# Patient Record
Sex: MALE | Race: WHITE | NOT HISPANIC OR LATINO | Employment: OTHER | URBAN - NONMETROPOLITAN AREA
[De-identification: names, ages, dates, MRNs, and addresses within clinical notes are randomized per-mention and may not be internally consistent; named-entity substitution may affect disease eponyms.]

---

## 2017-01-26 ENCOUNTER — OFFICE VISIT (OUTPATIENT)
Dept: CARDIOLOGY | Facility: CLINIC | Age: 82
End: 2017-01-26

## 2017-01-26 VITALS
HEART RATE: 61 BPM | OXYGEN SATURATION: 98 % | BODY MASS INDEX: 27.17 KG/M2 | WEIGHT: 205 LBS | HEIGHT: 73 IN | SYSTOLIC BLOOD PRESSURE: 96 MMHG | DIASTOLIC BLOOD PRESSURE: 60 MMHG

## 2017-01-26 DIAGNOSIS — I25.10 CAD IN NATIVE ARTERY: Primary | ICD-10-CM

## 2017-01-26 DIAGNOSIS — I10 ESSENTIAL HYPERTENSION: ICD-10-CM

## 2017-01-26 DIAGNOSIS — E78.2 MIXED HYPERLIPIDEMIA: ICD-10-CM

## 2017-01-26 PROBLEM — R94.39 ABNORMAL NUCLEAR STRESS TEST: Status: ACTIVE | Noted: 2017-01-26

## 2017-01-26 PROCEDURE — 99214 OFFICE O/P EST MOD 30 MIN: CPT | Performed by: INTERNAL MEDICINE

## 2017-01-26 PROCEDURE — 93000 ELECTROCARDIOGRAM COMPLETE: CPT | Performed by: INTERNAL MEDICINE

## 2017-01-26 RX ORDER — ATORVASTATIN CALCIUM 20 MG/1
TABLET, FILM COATED ORAL EVERY OTHER DAY
COMMUNITY
Start: 2017-01-23

## 2017-01-26 RX ORDER — PANTOPRAZOLE SODIUM 40 MG/1
TABLET, DELAYED RELEASE ORAL
COMMUNITY
Start: 2017-01-22

## 2017-01-26 RX ORDER — CLOPIDOGREL BISULFATE 75 MG/1
TABLET ORAL
COMMUNITY
Start: 2017-01-22

## 2017-01-26 RX ORDER — TAMSULOSIN HYDROCHLORIDE 0.4 MG/1
CAPSULE ORAL
COMMUNITY
Start: 2017-01-11 | End: 2017-08-24 | Stop reason: SDUPTHER

## 2017-01-26 RX ORDER — SUCRALFATE 1 G/1
TABLET ORAL
COMMUNITY
Start: 2017-01-22

## 2017-01-26 RX ORDER — LOSARTAN POTASSIUM AND HYDROCHLOROTHIAZIDE 12.5; 5 MG/1; MG/1
TABLET ORAL
COMMUNITY
Start: 2016-11-17

## 2017-01-26 RX ORDER — FINASTERIDE 5 MG/1
TABLET, FILM COATED ORAL
COMMUNITY
Start: 2017-01-22 | End: 2017-08-24 | Stop reason: SDUPTHER

## 2017-01-26 NOTE — LETTER
January 26, 2017     Viktor Frederick MD  [address] [place] [address] [address]  [place] [address]    Patient: Pat Hogue   YOB: 1935   Date of Visit: 1/26/2017       Dear Dr. Otoniel MD:    Thank you for referring Pat Hogue to me for evaluation. Below are the relevant portions of my assessment and plan of care.    If you have questions, please do not hesitate to call me. I look forward to following Pat along with you.         Sincerely,        Ozzie Bearden MD        CC: No Recipients  Ozzie Bearden MD  1/26/2017  9:11 AM  Signed  Pat Hogue  [id number]  1935  81 y.o.  male    Referring Provider: Viktor Frederick MD    Reason for Follow-up Visit: CAD    Overall doing well  Denies any chest pain  No excessive shortness of breath  Compliant with medications    History of present illness:  Pat Hogue is a 81 y.o. yo male with history of CAD who presents today for   Chief Complaint   Patient presents with   • Coronary Artery Disease     6 mo    .    History  Past Medical History   Diagnosis Date   • Abnormal nuclear stress test    • Benign essential hypertension    • CAD in native artery    • Chest pain    • Chronic airway obstruction    • GI bleed    • Hematuria    • History of coronary artery bypass graft    • Hyperlipemia, mixed    • Hypertension    • Stented coronary artery    • Venous thrombosis    ,   Past Surgical History   Procedure Laterality Date   • Cardiac catheterization Left 2006     : small vd, med mgmt;;2/2011 med mgm   • Gastrectomy partial / total     • Coronary artery bypass graft  03/07/2006      LIMA to LAD, SVG to D1, SVG to OM2, SVG to PDA   • Cholecystectomy     • Coronary stent placement       x 1 - 4/27/14   ,   Family History   Problem Relation Age of Onset   • Heart attack Father    • No Known Problems Mother    ,   Social History   Substance Use Topics   • Smoking status: Former Smoker     Quit date: 1985   • Smokeless tobacco: None   • Alcohol use No   ,      "    Medications  Current Outpatient Prescriptions   Medication Sig Dispense Refill   • amLODIPine (NORVASC) 2.5 MG tablet Take 5 mg by mouth Daily.     • aspirin 81 MG tablet Take 81 mg by mouth Daily.     • atorvastatin (LIPITOR) 20 MG tablet      • clopidogrel (PLAVIX) 75 MG tablet      • finasteride (PROSCAR) 5 MG tablet      • losartan-hydrochlorothiazide (HYZAAR) 50-12.5 MG per tablet      • Multiple Vitamins-Minerals (MULTIVITAMIN ADULT PO) Take  by mouth.     • pantoprazole (PROTONIX) 40 MG EC tablet      • ranolazine (RANEXA) 1000 MG 12 hr tablet Take 1,000 mg by mouth 2 (Two) Times a Day.     • sucralfate (CARAFATE) 1 G tablet      • tamsulosin (FLOMAX) 0.4 MG capsule 24 hr capsule        No current facility-administered medications for this visit.        Allergies:  Morphine and related    Review of Systems  Review of Systems   Constitution: Negative.   HENT: Negative.    Eyes: Negative.    Cardiovascular: Negative for chest pain, claudication, cyanosis, dyspnea on exertion, irregular heartbeat, leg swelling, near-syncope, orthopnea, palpitations, paroxysmal nocturnal dyspnea and syncope.   Respiratory: Negative.    Endocrine: Negative.    Hematologic/Lymphatic: Negative.    Skin: Negative.    Gastrointestinal: Negative for anorexia.   Genitourinary: Negative.    Neurological: Negative.    Psychiatric/Behavioral: Negative.        Objective     Physical Exam:  Visit Vitals   • BP 96/60   • Pulse 61   • Ht 73\" (185.4 cm)   • Wt 205 lb (93 kg)   • SpO2 98%   • BMI 27.05 kg/m2     Physical Exam   Constitutional: He appears well-developed.   HENT:   Head: Normocephalic.   Neck: Normal carotid pulses and no JVD present. No tracheal tenderness present. Carotid bruit is not present. No tracheal deviation and no edema present.   Cardiovascular: Regular rhythm, normal heart sounds and normal pulses.    Pulmonary/Chest: Effort normal. No stridor.   Abdominal: Soft.   Neurological: He is alert. He has normal strength. " No cranial nerve deficit or sensory deficit.   Skin: Skin is warm.   Psychiatric: He has a normal mood and affect. His speech is normal and behavior is normal.       Results Review:       ECG 12 Lead  Date/Time: 1/26/2017 9:08 AM  Performed by: ALESSIA ALARCON  Authorized by: ALESSIA ALARCON   Comparison: compared with previous ECG from 6/16/2016  Similar to previous ECG  Rhythm: sinus bradycardia  Rate: bradycardic  QRS axis: normal  Clinical impression: normal ECG            Assessment/Plan   Patient Active Problem List   Diagnosis   • CAD in native artery   • Essential hypertension   • Mixed hyperlipidemia   • Abnormal nuclear stress test MODERATE LATERAL ISCHEMIA        No palpitations. No significant pedal edema. Compliant with medications and diet. Latest labs and medications reviewed.    Plan:    Close follow up with you as scheduled.  Intensive factor modifications.  See order list.    Counseled regarding disease appropriate diet, fluid, caffeine, stimulants and sodium intake as well as importance of compliance to diet, exercise and regular follow up.  Avoid NSAIDS  He does not want any invasive testing    Return in about 6 months (around 7/26/2017).

## 2017-01-26 NOTE — PROGRESS NOTES
Pat Hogue  [id number]  1935  81 y.o.  male    Referring Provider: Viktor Frederick MD    Reason for Follow-up Visit: CAD    Overall doing well  Denies any chest pain  No excessive shortness of breath  Compliant with medications    History of present illness:  Pat Hogue is a 81 y.o. yo male with history of CAD who presents today for   Chief Complaint   Patient presents with   • Coronary Artery Disease     6 mo    .    History  Past Medical History   Diagnosis Date   • Abnormal nuclear stress test    • Benign essential hypertension    • CAD in native artery    • Chest pain    • Chronic airway obstruction    • GI bleed    • Hematuria    • History of coronary artery bypass graft    • Hyperlipemia, mixed    • Hypertension    • Stented coronary artery    • Venous thrombosis    ,   Past Surgical History   Procedure Laterality Date   • Cardiac catheterization Left 2006     : small vd, med mgmt;;2/2011 med mgm   • Gastrectomy partial / total     • Coronary artery bypass graft  03/07/2006      LIMA to LAD, SVG to D1, SVG to OM2, SVG to PDA   • Cholecystectomy     • Coronary stent placement       x 1 - 4/27/14   ,   Family History   Problem Relation Age of Onset   • Heart attack Father    • No Known Problems Mother    ,   Social History   Substance Use Topics   • Smoking status: Former Smoker     Quit date: 1985   • Smokeless tobacco: None   • Alcohol use No   ,     Medications  Current Outpatient Prescriptions   Medication Sig Dispense Refill   • amLODIPine (NORVASC) 2.5 MG tablet Take 5 mg by mouth Daily.     • aspirin 81 MG tablet Take 81 mg by mouth Daily.     • atorvastatin (LIPITOR) 20 MG tablet      • clopidogrel (PLAVIX) 75 MG tablet      • finasteride (PROSCAR) 5 MG tablet      • losartan-hydrochlorothiazide (HYZAAR) 50-12.5 MG per tablet      • Multiple Vitamins-Minerals (MULTIVITAMIN ADULT PO) Take  by mouth.     • pantoprazole (PROTONIX) 40 MG EC tablet      • ranolazine (RANEXA) 1000 MG 12 hr tablet  "Take 1,000 mg by mouth 2 (Two) Times a Day.     • sucralfate (CARAFATE) 1 G tablet      • tamsulosin (FLOMAX) 0.4 MG capsule 24 hr capsule        No current facility-administered medications for this visit.        Allergies:  Morphine and related    Review of Systems  Review of Systems   Constitution: Negative.   HENT: Negative.    Eyes: Negative.    Cardiovascular: Negative for chest pain, claudication, cyanosis, dyspnea on exertion, irregular heartbeat, leg swelling, near-syncope, orthopnea, palpitations, paroxysmal nocturnal dyspnea and syncope.   Respiratory: Negative.    Endocrine: Negative.    Hematologic/Lymphatic: Negative.    Skin: Negative.    Gastrointestinal: Negative for anorexia.   Genitourinary: Negative.    Neurological: Negative.    Psychiatric/Behavioral: Negative.        Objective     Physical Exam:  Visit Vitals   • BP 96/60   • Pulse 61   • Ht 73\" (185.4 cm)   • Wt 205 lb (93 kg)   • SpO2 98%   • BMI 27.05 kg/m2     Physical Exam   Constitutional: He appears well-developed.   HENT:   Head: Normocephalic.   Neck: Normal carotid pulses and no JVD present. No tracheal tenderness present. Carotid bruit is not present. No tracheal deviation and no edema present.   Cardiovascular: Regular rhythm, normal heart sounds and normal pulses.    Pulmonary/Chest: Effort normal. No stridor.   Abdominal: Soft.   Neurological: He is alert. He has normal strength. No cranial nerve deficit or sensory deficit.   Skin: Skin is warm.   Psychiatric: He has a normal mood and affect. His speech is normal and behavior is normal.       Results Review:       ECG 12 Lead  Date/Time: 1/26/2017 9:08 AM  Performed by: ALESSIA ALARCON  Authorized by: ALESSIA ALARCON   Comparison: compared with previous ECG from 6/16/2016  Similar to previous ECG  Rhythm: sinus bradycardia  Rate: bradycardic  QRS axis: normal  Clinical impression: normal ECG            Assessment/Plan   Patient Active Problem List   Diagnosis   • CAD in native artery "   • Essential hypertension   • Mixed hyperlipidemia   • Abnormal nuclear stress test MODERATE LATERAL ISCHEMIA        No palpitations. No significant pedal edema. Compliant with medications and diet. Latest labs and medications reviewed.    Plan:    Close follow up with you as scheduled.  Intensive factor modifications.  See order list.    Counseled regarding disease appropriate diet, fluid, caffeine, stimulants and sodium intake as well as importance of compliance to diet, exercise and regular follow up.  Avoid NSAIDS  He does not want any invasive testing    Return in about 6 months (around 7/26/2017).

## 2017-01-26 NOTE — MR AVS SNAPSHOT
Pat Hogue   1/26/2017 8:15 AM   Office Visit    Dept Phone:  [phone]   Encounter #:  [id number]    Provider:  Ozzie Bearden MD   Department:  [facility]                Your Full Care Plan              Your Updated Medication List          This list is accurate as of: 1/26/17  9:16 AM.  Always use your most recent med list.                amLODIPine 2.5 MG tablet   Commonly known as:  NORVASC       aspirin 81 MG tablet       atorvastatin 20 MG tablet   Commonly known as:  LIPITOR       clopidogrel 75 MG tablet   Commonly known as:  PLAVIX       finasteride 5 MG tablet   Commonly known as:  PROSCAR       losartan-hydrochlorothiazide 50-12.5 MG per tablet   Commonly known as:  HYZAAR       MULTIVITAMIN ADULT PO       pantoprazole 40 MG EC tablet   Commonly known as:  PROTONIX       RANEXA 1000 MG 12 hr tablet   Generic drug:  ranolazine       sucralfate 1 G tablet   Commonly known as:  CARAFATE       tamsulosin 0.4 MG capsule 24 hr capsule   Commonly known as:  FLOMAX               We Performed the Following     ECG 12 Lead       You Were Diagnosed With        Codes Comments    CAD in native artery    -  Primary ICD-10-CM: I25.10  ICD-9-CM: 414.01     Essential hypertension     ICD-10-CM: I10  ICD-9-CM: 401.9     Mixed hyperlipidemia     ICD-10-CM: E78.2  ICD-9-CM: 272.2       Instructions     None    Patient Instructions History      Upcoming Appointments     Visit Type Date Time Department    FOLLOW UP 1/26/2017  8:15 AM [facility]    NEW PATIENT 2/16/2017 10:00 AM [facility]    FOLLOW UP 7/27/2017  8:00 AM [facility]      MyChart Signup     [facility] [employer] allows you to send messages to your doctor, view your test results, renew your prescriptions, schedule appointments, and more. To sign up, go to [employer] and click on the Sign Up Now link in the New User? box. Enter your [employer] Activation Code exactly as  "it appears below along with the last four digits of your Social Security Number and your Date of Birth () to complete the sign-up process. If you do not sign up before the expiration date, you must request a new code.    [employer] Activation Code: [email or web address]  Expires: 2017  9:14 AM    If you have questions, you can email [email or web address] or call [phone] to talk to our [employer] staff. Remember, [employer] is NOT to be used for urgent needs. For medical emergencies, dial 911.               Other Info from Your Visit           Your Appointments     2017 10:00 AM CST   New Patient with Loi Pace MD   [facility] UROLOGY (--)    [address]  [place] [address] [phone]           Bring all previous medical records and films, along with current medications and insurance information.            2017  8:00 AM CDT   Follow Up with Ozzie Bearden MD   [facility] (--)    [address]  [place] [address] [phone]           Arrive 15 minutes prior to appointment.              Allergies     Morphine And Related        Reason for Visit     Coronary Artery Disease 6 mo       Vital Signs     Blood Pressure Pulse Height Weight Oxygen Saturation Body Mass Index    96/60 61 73\" (185.4 cm) 205 lb (93 kg) 98% 27.05 kg/m2    Smoking Status                   Former Smoker           Problems and Diagnoses Noted     Abnormal cardiovascular stress test    Heart disease due to blocked artery    High blood pressure    Mixed hyperlipidemia      Results     ECG 12 Lead               "

## 2017-02-16 ENCOUNTER — OFFICE VISIT (OUTPATIENT)
Dept: UROLOGY | Facility: CLINIC | Age: 82
End: 2017-02-16

## 2017-02-16 VITALS
HEIGHT: 73 IN | WEIGHT: 162 LBS | SYSTOLIC BLOOD PRESSURE: 132 MMHG | TEMPERATURE: 97 F | DIASTOLIC BLOOD PRESSURE: 78 MMHG | BODY MASS INDEX: 21.47 KG/M2

## 2017-02-16 DIAGNOSIS — N40.1 BENIGN NON-NODULAR PROSTATIC HYPERPLASIA WITH LOWER URINARY TRACT SYMPTOMS: ICD-10-CM

## 2017-02-16 DIAGNOSIS — R33.9 INCOMPLETE BLADDER EMPTYING: Primary | ICD-10-CM

## 2017-02-16 LAB
BILIRUB BLD-MCNC: NEGATIVE MG/DL
CLARITY, POC: CLEAR
COLOR UR: YELLOW
GLUCOSE UR STRIP-MCNC: NEGATIVE MG/DL
KETONES UR QL: NEGATIVE
LEUKOCYTE EST, POC: ABNORMAL
NITRITE UR-MCNC: NEGATIVE MG/ML
PH UR: 5 [PH] (ref 5–8)
PROT UR STRIP-MCNC: ABNORMAL MG/DL
RBC # UR STRIP: ABNORMAL /UL
SP GR UR: 1.02 (ref 1–1.03)
UROBILINOGEN UR QL: NORMAL

## 2017-02-16 PROCEDURE — 81003 URINALYSIS AUTO W/O SCOPE: CPT | Performed by: UROLOGY

## 2017-02-16 PROCEDURE — 51798 US URINE CAPACITY MEASURE: CPT | Performed by: UROLOGY

## 2017-02-16 PROCEDURE — 99203 OFFICE O/P NEW LOW 30 MIN: CPT | Performed by: UROLOGY

## 2017-02-16 NOTE — PROGRESS NOTES
Mr. Hogue is 81 y.o. male    CHIEF COMPLAINT: I am here to establish a new doctor for my prostate issues    HPI  Benign Prostatic hyperplasia  Patient was initially diagnosed with benign prostatic hyperplasia [id number] years ago. This was identified in the context of worsening voiding symptoms. Severity of the diease would be moderate. This has been managed with Alpha blockers and 5 alpha reductase inhibitors. Alpha blockers and 5 alpha reductase inhibitors has/have stablized the symptoms which is satisfactory to the patient..  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 His most bothersome symptom(s) is/are nocturia x 2, urinary frequency, weak stream.      The following portions of the patient's history were reviewed and updated as appropriate: allergies, current medications, past family history, past medical history, past social history, past surgical history and problem list.    Review of Systems   Constitutional: Negative for appetite change and fever.   HENT: Negative for hearing loss and sore throat.    Eyes: Negative for pain and redness.   Respiratory: Negative for cough and shortness of breath.    Cardiovascular: Negative for chest pain and leg swelling.   Gastrointestinal: Negative for anal bleeding, nausea and vomiting.   Endocrine: Negative for cold intolerance and heat intolerance.   Genitourinary: Positive for frequency and urgency. Negative for dysuria, flank pain and hematuria.   Musculoskeletal: Negative for joint swelling and myalgias.   Skin: Negative for color change and rash.   Allergic/Immunologic: Negative for immunocompromised state.   Neurological: Negative for dizziness and speech difficulty.   Hematological: Negative for adenopathy. Does not bruise/bleed easily.   Psychiatric/Behavioral: Negative for dysphoric mood and suicidal ideas.         Current Outpatient Prescriptions:   •   amLODIPine (NORVASC) 2.5 MG tablet, Take 5 mg by mouth Daily., Disp: , Rfl:   •  aspirin 81 MG tablet, Take 81 mg by mouth Daily., Disp: , Rfl:   •  atorvastatin (LIPITOR) 20 MG tablet, , Disp: , Rfl:   •  clopidogrel (PLAVIX) 75 MG tablet, , Disp: , Rfl:   •  finasteride (PROSCAR) 5 MG tablet, , Disp: , Rfl:   •  losartan-hydrochlorothiazide (HYZAAR) 50-12.5 MG per tablet, , Disp: , Rfl:   •  Multiple Vitamins-Minerals (MULTIVITAMIN ADULT PO), Take  by mouth., Disp: , Rfl:   •  pantoprazole (PROTONIX) 40 MG EC tablet, , Disp: , Rfl:   •  ranolazine (RANEXA) 1000 MG 12 hr tablet, Take 1,000 mg by mouth 2 (Two) Times a Day., Disp: , Rfl:   •  sucralfate (CARAFATE) 1 G tablet, , Disp: , Rfl:   •  tamsulosin (FLOMAX) 0.4 MG capsule 24 hr capsule, , Disp: , Rfl:     Past Medical History   Diagnosis Date   • Abnormal nuclear stress test    • Benign essential hypertension    • CAD in native artery    • Chest pain    • Chronic airway obstruction    • GI bleed    • Hematuria    • History of coronary artery bypass graft    • Hyperlipemia, mixed    • Hypertension    • Stented coronary artery    • Venous thrombosis        Past Surgical History   Procedure Laterality Date   • Cardiac catheterization Left 2006     : small vd, med mgmt;;2/2011 med mgm   • Gastrectomy partial / total     • Coronary artery bypass graft  03/07/2006      LIMA to LAD, SVG to D1, SVG to OM2, SVG to PDA   • Cholecystectomy     • Coronary stent placement       x 1 - 4/27/14       Social History     Social History   • Marital status:      Spouse name: N/A   • Number of children: N/A   • Years of education: N/A     Social History Main Topics   • Smoking status: Former Smoker     Quit date: 1985   • Smokeless tobacco: None   • Alcohol use No   • Drug use: No   • Sexual activity: Defer     Other Topics Concern   • None     Social History Narrative       Family History   Problem Relation Age of Onset   • Heart attack Father    • No Known Problems  "Mother        Visit Vitals   • /78   • Temp 97 °F (36.1 °C)   • Ht 73\" (185.4 cm)   • Wt 162 lb (73.5 kg)   • BMI 21.37 kg/m2       Physical Exam   Constitutional: He is oriented to person, place, and time. He appears well-developed and well-nourished. No distress.   HENT:   Nose: Nose normal.   Neck: Normal range of motion. Neck supple. No tracheal deviation present. No thyromegaly present.   Cardiovascular: Normal rate, regular rhythm and intact distal pulses.    No significant edema or tenderness    Pulmonary/Chest: Breath sounds normal. No accessory muscle usage. No respiratory distress.   Abdominal: Soft. Bowel sounds are normal. He exhibits no distension, no ascites and no mass. There is no hepatosplenomegaly. There is no tenderness. There is no rebound, no guarding and no CVA tenderness. No hernia.   Stool specimen is not indicated for my portion of the exam   Genitourinary: Testes normal and penis normal. Rectal exam shows no mass, no tenderness, anal tone normal and guaiac negative stool. Tender: no nodules. Right testis shows no mass, no swelling and no tenderness. Left testis shows no mass, no swelling and no tenderness. No penile tenderness (no lesion or deformities).   Genitourinary Comments:  The urethral meatus normal in position without evidence of stricture. Epididymis without mass or tenderness. Vas Deferens is palpably normal.Anus and perineum without mass or tenderness. The seminal vesicle are without mass or enlargement. The prostate is approximately 30 ml. It is Symmetric, with a Soft consistency. There are no nodules present. . The seminal vesicles are Palpably normal in size and without mass.     Lymphadenopathy:     He has no cervical adenopathy. No inguinal adenopathy noted on the right or left side.        Right: No inguinal adenopathy present.        Left: No inguinal adenopathy present.   Neurological: He is alert and oriented to person, place, and time.   Skin: Skin is warm and " dry. No rash noted. He is not diaphoretic. No pallor.   Psychiatric: He has a normal mood and affect. His behavior is normal.   Vitals reviewed.        Results for orders placed or performed in visit on 02/16/17   POC Urinalysis Dipstick, Automated   Result Value Ref Range    Color Yellow Yellow, Straw, Dark Yellow, Ashwini    Clarity, UA Clear Clear    Glucose, UA Negative Negative, 1000 mg/dL (3+) mg/dL    Bilirubin Negative Negative    Ketones, UA Negative Negative    Specific Gravity  1.025 1.005 - 1.030    Blood, UA Large (A) Negative    pH, Urine 5.0 5.0 - 8.0    Protein, POC Trace (A) Negative mg/dL    Urobilinogen, UA Normal Normal    Leukocytes Small (1+) (A) Negative    Nitrite, UA Negative Negative   Bladder Scan interpretation  Estimation of residual urine via abdominal ultrasound  Residual Urine: 493 ml  Indication: incomplete bladder emptying  Position: Supine  Examination: Incremental scanning of the suprapubic area using 3 MHz transducer using copious amounts of acoustic gel.   Findings: An anechoic area was demonstrated which represented the bladder, with measurement of residual urine as noted. I inspected this myself. In that the residual urine was stable or insignificant, no treatment will be necessary at this time.         Imaging Results (last 7 days)     ** No results found for the last 168 hours. **          Assessment and Plan  Diagnoses and all orders for this visit:    Incomplete bladder emptying  -     POC Urinalysis Dipstick, Automated    Benign non-nodular prostatic hyperplasia with lower urinary tract symptoms     presently he is maximally managed medically from a BPH standpoint with both tamsulosin and finasteride.  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  His renal function has always tolerated this well.  I think it is reasonable to continue to follow him.  I would continue the finasteride and tamsulosin.  I  did explain to him that he may have to do intermittent catheterization one day if things continue to deteriorate.    Loi Pace MD  02/16/17  5:52 PM    EMR Dragon/Transcription disclaimer:  Much of this encounter note is an electronic transcription/translation of spoken language to printed text. The electronic translation of spoken language may permit erroneous, or at times, nonsensical words or phrases to be inadvertently transcribed; although I have reviewed the note for such errors, some may still exist.       Cc: Dr. Frederick

## 2017-07-27 ENCOUNTER — OFFICE VISIT (OUTPATIENT)
Dept: CARDIOLOGY | Facility: CLINIC | Age: 82
End: 2017-07-27

## 2017-07-27 VITALS
HEIGHT: 73 IN | DIASTOLIC BLOOD PRESSURE: 58 MMHG | WEIGHT: 156.2 LBS | HEART RATE: 84 BPM | SYSTOLIC BLOOD PRESSURE: 122 MMHG | BODY MASS INDEX: 20.7 KG/M2

## 2017-07-27 DIAGNOSIS — R94.39 ABNORMAL NUCLEAR STRESS TEST: ICD-10-CM

## 2017-07-27 DIAGNOSIS — I10 ESSENTIAL HYPERTENSION: ICD-10-CM

## 2017-07-27 DIAGNOSIS — I25.10 CAD IN NATIVE ARTERY: Primary | ICD-10-CM

## 2017-07-27 DIAGNOSIS — E78.2 MIXED HYPERLIPIDEMIA: ICD-10-CM

## 2017-07-27 DIAGNOSIS — Z95.5 STENTED CORONARY ARTERY: ICD-10-CM

## 2017-07-27 DIAGNOSIS — Z95.1 S/P CABG X 4: ICD-10-CM

## 2017-07-27 PROCEDURE — 99214 OFFICE O/P EST MOD 30 MIN: CPT | Performed by: INTERNAL MEDICINE

## 2017-07-27 PROCEDURE — 93000 ELECTROCARDIOGRAM COMPLETE: CPT | Performed by: INTERNAL MEDICINE

## 2017-07-27 NOTE — PROGRESS NOTES
Pat Hogue  [id number]  1935  82 y.o.  male    Referring Provider: Viktor Frederick MD    Reason for Follow-up Visit: coronary artery disease Coronary artery bypass grafting Stented coronary artery.   Overall feeling well   No chest pain or shortness of breath   No palpitations  Compliant with medications  Good effort tolerance  Rides bicycle 1 hour at a time  History of present illness:  Pat Hogue is a 82 y.o. yo male with history of coronary artery disease Coronary artery bypass grafting Stented coronary artery.  who presents today for   Chief Complaint   Patient presents with   • Coronary Artery Disease     6mo. F/U. Has been feeling well. No chest pain, palpitations, SOA or edema   .    History  Past Medical History:   Diagnosis Date   • Abnormal nuclear stress test    • Benign essential hypertension    • CAD in native artery    • Chest pain    • Chronic airway obstruction    • GI bleed    • Hematuria    • History of coronary artery bypass graft    • Hyperlipemia, mixed    • Hypertension    • Stented coronary artery    • Venous thrombosis    ,   Past Surgical History:   Procedure Laterality Date   • CARDIAC CATHETERIZATION Left 2006    : small vd, med mgmt;;2/2011 med mgm   • CHOLECYSTECTOMY     • CORONARY ARTERY BYPASS GRAFT  03/07/2006     LIMA to LAD, SVG to D1, SVG to OM2, SVG to PDA   • CORONARY STENT PLACEMENT      x 1 - 4/27/14   • GASTRECTOMY PARTIAL / TOTAL     ,   Family History   Problem Relation Age of Onset   • Heart attack Father    • No Known Problems Mother    ,   Social History   Substance Use Topics   • Smoking status: Former Smoker     Quit date: 1985   • Smokeless tobacco: None   • Alcohol use No   ,     Medications  Current Outpatient Prescriptions   Medication Sig Dispense Refill   • amLODIPine (NORVASC) 2.5 MG tablet Take 5 mg by mouth Daily.     • aspirin 81 MG tablet Take 81 mg by mouth Daily.     • atorvastatin (LIPITOR) 20 MG tablet Every Other Day.     • clopidogrel  "(PLAVIX) 75 MG tablet      • finasteride (PROSCAR) 5 MG tablet      • losartan-hydrochlorothiazide (HYZAAR) 50-12.5 MG per tablet      • Multiple Vitamins-Minerals (MULTIVITAMIN ADULT PO) Take  by mouth.     • pantoprazole (PROTONIX) 40 MG EC tablet      • ranolazine (RANEXA) 1000 MG 12 hr tablet Take 1,000 mg by mouth Daily. 1 1/2     • sucralfate (CARAFATE) 1 G tablet      • tamsulosin (FLOMAX) 0.4 MG capsule 24 hr capsule        No current facility-administered medications for this visit.        Allergies:  Morphine and related    Review of Systems  Review of Systems   Constitution: Negative.   HENT: Negative.    Eyes: Negative.    Cardiovascular: Positive for dyspnea on exertion. Negative for chest pain, claudication, cyanosis, irregular heartbeat, leg swelling, near-syncope, orthopnea, palpitations, paroxysmal nocturnal dyspnea and syncope.   Respiratory: Negative.    Endocrine: Negative.    Hematologic/Lymphatic: Negative.    Skin: Negative.    Gastrointestinal: Negative for anorexia.   Genitourinary: Negative.    Neurological: Negative.    Psychiatric/Behavioral: Negative.        Objective     Physical Exam:  /58  Pulse 84  Ht 73\" (185.4 cm)  Wt 156 lb 3.2 oz (70.9 kg)  BMI 20.61 kg/m2  Physical Exam   Constitutional: He appears well-developed.   HENT:   Head: Normocephalic.   Neck: Normal carotid pulses and no JVD present. No tracheal tenderness present. Carotid bruit is not present. No tracheal deviation and no edema present.   Cardiovascular: Regular rhythm and normal pulses.    Murmur heard.   Systolic murmur is present with a grade of 2/6   Pulmonary/Chest: Effort normal. No stridor.   Abdominal: Soft.   Neurological: He is alert. He has normal strength. No cranial nerve deficit or sensory deficit.   Skin: Skin is warm.   Psychiatric: He has a normal mood and affect. His speech is normal and behavior is normal.       Results Review:       ECG 12 Lead  Date/Time: 7/27/2017 8:41 AM  Performed by: " ALESSIA ALARCON  Authorized by: ALESSIA ALARCON   Comparison: compared with previous ECG from 1/26/2017  Similar to previous ECG  Rhythm: sinus rhythm  Rate: normal  T depression: I and aVL  QRS axis: normal  Q waves: V1, V2 and V3  Clinical impression: abnormal ECG            Assessment/Plan   Patient Active Problem List   Diagnosis   • CAD in native artery   • Essential hypertension   • Mixed hyperlipidemia   • Abnormal nuclear stress test MODERATE LATERAL ISCHEMIA   • S/P CABG x 4   • Stented coronary artery        No palpitations. No significant pedal edema. Compliant with medications and diet. Latest labs and medications reviewed.    Plan:    Keep LDL below 70 mg/dl. Monitor liver and renal functions.  Last LDL 2016 was 53  No additional cardiac testing required at this point in time.   Monitor for any signs of bleeding including red or dark stools. Fall precautions.   Patient is asked to monitor BP at home or work, several times per month and return with written values at next office visit.   Close follow up with you as scheduled.  Intensive factor modifications.  See order list.    Counseled regarding disease appropriate diet, fluid, caffeine, stimulants and sodium intake as well as importance of compliance to diet, exercise and regular follow up.  Avoid NSAIDS  He does not want any invasive testing    Return in about 6 months (around 1/27/2018).

## 2017-08-07 RX ORDER — AMLODIPINE BESYLATE 2.5 MG/1
TABLET ORAL
Qty: 90 TABLET | Refills: 3 | Status: SHIPPED | OUTPATIENT
Start: 2017-08-07 | End: 2018-01-01 | Stop reason: SDUPTHER

## 2017-08-24 ENCOUNTER — OFFICE VISIT (OUTPATIENT)
Dept: UROLOGY | Facility: CLINIC | Age: 82
End: 2017-08-24

## 2017-08-24 VITALS
BODY MASS INDEX: 20.67 KG/M2 | TEMPERATURE: 97.3 F | WEIGHT: 156 LBS | SYSTOLIC BLOOD PRESSURE: 124 MMHG | DIASTOLIC BLOOD PRESSURE: 82 MMHG | HEIGHT: 73 IN

## 2017-08-24 DIAGNOSIS — R33.9 INCOMPLETE BLADDER EMPTYING: ICD-10-CM

## 2017-08-24 DIAGNOSIS — N32.3 ACQUIRED BLADDER DIVERTICULUM: ICD-10-CM

## 2017-08-24 DIAGNOSIS — N40.1 BENIGN NON-NODULAR PROSTATIC HYPERPLASIA WITH LOWER URINARY TRACT SYMPTOMS: Primary | ICD-10-CM

## 2017-08-24 LAB
BILIRUB BLD-MCNC: NEGATIVE MG/DL
CLARITY, POC: CLEAR
COLOR UR: YELLOW
GLUCOSE UR STRIP-MCNC: NEGATIVE MG/DL
KETONES UR QL: NEGATIVE
LEUKOCYTE EST, POC: ABNORMAL
NITRITE UR-MCNC: NEGATIVE MG/ML
PH UR: 5.5 [PH] (ref 5–8)
PROT UR STRIP-MCNC: ABNORMAL MG/DL
RBC # UR STRIP: ABNORMAL /UL
SP GR UR: 1.02 (ref 1–1.03)
UROBILINOGEN UR QL: NORMAL

## 2017-08-24 PROCEDURE — 81001 URINALYSIS AUTO W/SCOPE: CPT | Performed by: UROLOGY

## 2017-08-24 PROCEDURE — 99213 OFFICE O/P EST LOW 20 MIN: CPT | Performed by: UROLOGY

## 2017-08-24 PROCEDURE — 51798 US URINE CAPACITY MEASURE: CPT | Performed by: UROLOGY

## 2017-08-24 RX ORDER — TAMSULOSIN HYDROCHLORIDE 0.4 MG/1
1 CAPSULE ORAL DAILY
Qty: 90 CAPSULE | Refills: 3 | Status: SHIPPED | OUTPATIENT
Start: 2017-08-24

## 2017-08-24 RX ORDER — FINASTERIDE 5 MG/1
5 TABLET, FILM COATED ORAL DAILY
Qty: 90 TABLET | Refills: 3 | Status: SHIPPED | OUTPATIENT
Start: 2017-08-24 | End: 2017-09-12 | Stop reason: SDUPTHER

## 2017-08-24 NOTE — PROGRESS NOTES
Mr. Hogue is 82 y.o. male    CHIEF COMPLAINT: I am here to follow up on my BPH    HPI  He is here for follow-up of his chronic issue, benign prostatic hyperplasia with obstruction.  This started over 20 years ago.  This was diagnosed during evaluation of worsening voiding symptoms.  He has moderate severity.  He is managed with maximum medical therapy that includes alpha blockers and 5 alpha reductase inhibitors which is improved him over time.  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  Symptoms include mild nocturia, urinary frequency, and weakened urinary stream.  He returns today for follow-up.    The following portions of the patient's history were reviewed and updated as appropriate: allergies, current medications, past family history, past medical history, past social history, past surgical history and problem list.    Review of Systems   Constitutional: Negative for chills and fever.   Gastrointestinal: Negative for abdominal pain, anal bleeding and blood in stool.   Genitourinary: Negative for flank pain and hematuria.         Current Outpatient Prescriptions:   •  amLODIPine (NORVASC) 2.5 MG tablet, TAKE 1 TABLET EVERY DAY, Disp: 90 tablet, Rfl: 3  •  aspirin 81 MG tablet, Take 81 mg by mouth Daily., Disp: , Rfl:   •  atorvastatin (LIPITOR) 20 MG tablet, Every Other Day., Disp: , Rfl:   •  clopidogrel (PLAVIX) 75 MG tablet, , Disp: , Rfl:   •  finasteride (PROSCAR) 5 MG tablet, , Disp: , Rfl:   •  losartan-hydrochlorothiazide (HYZAAR) 50-12.5 MG per tablet, , Disp: , Rfl:   •  Multiple Vitamins-Minerals (MULTIVITAMIN ADULT PO), Take  by mouth., Disp: , Rfl:   •  pantoprazole (PROTONIX) 40 MG EC tablet, , Disp: , Rfl:   •  ranolazine (RANEXA) 1000 MG 12 hr tablet, Take 1,000 mg by mouth Daily. 1 1/2, Disp: , Rfl:   •  sucralfate (CARAFATE) 1 G tablet, , Disp: , Rfl:   •  tamsulosin (FLOMAX) 0.4 MG capsule 24 hr capsule, , Disp: , Rfl:  "    Past Medical History:   Diagnosis Date   • Abnormal nuclear stress test    • Benign essential hypertension    • CAD in native artery    • Chest pain    • Chronic airway obstruction    • GI bleed    • Hematuria    • History of coronary artery bypass graft    • Hyperlipemia, mixed    • Hypertension    • Stented coronary artery    • Venous thrombosis        Past Surgical History:   Procedure Laterality Date   • CARDIAC CATHETERIZATION Left 2006    : small vd, med mgmt;;2/2011 med mgm   • CHOLECYSTECTOMY     • CORONARY ARTERY BYPASS GRAFT  03/07/2006     LIMA to LAD, SVG to D1, SVG to OM2, SVG to PDA   • CORONARY STENT PLACEMENT      x 1 - 4/27/14   • GASTRECTOMY PARTIAL / TOTAL         Social History     Social History   • Marital status:      Spouse name: N/A   • Number of children: N/A   • Years of education: N/A     Social History Main Topics   • Smoking status: Former Smoker     Quit date: 1985   • Smokeless tobacco: None   • Alcohol use No   • Drug use: No   • Sexual activity: Defer     Other Topics Concern   • None     Social History Narrative       Family History   Problem Relation Age of Onset   • Heart attack Father    • No Known Problems Mother        /82  Temp 97.3 °F (36.3 °C)  Ht 73\" (185.4 cm)  Wt 156 lb (70.8 kg)  BMI 20.58 kg/m2    Physical Exam  Alert and oriented ×3  Not agitated or distressed  No obvious deformities  No respiratory distress  Skin without pallor or diaphoresis  MARY 25 gm benign feeling prostate gland.     Results for orders placed or performed in visit on 08/24/17   POC Urinalysis Dipstick, Automated   Result Value Ref Range    Color Yellow Yellow, Straw, Dark Yellow, Ashwini    Clarity, UA Clear Clear    Glucose, UA Negative Negative, 1000 mg/dL (3+) mg/dL    Bilirubin Negative Negative    Ketones, UA Negative Negative    Specific Gravity  1.020 1.005 - 1.030    Blood, UA Large (A) Negative    pH, Urine 5.5 5.0 - 8.0    Protein, POC 30 mg/dL (A) Negative mg/dL    " Urobilinogen, UA Normal Normal    Leukocytes Small (1+) (A) Negative    Nitrite, UA Negative Negative   Microscopic Urinalysis  I inspected the urine myself based on the clinical situation including the dipstick urine. The urine is spun in a centrifuge for three minutes. The spun urine shows 7-12 rbc/hpf, 0-2 wbc/hpf, none epi/hpf, negative bacteria, negative crystals, and negative casts.     Bladder Scan interpretation  Estimation of residual urine via abdominal ultrasound  Residual Urine: 554 ml  Indication: incomplete bladder emptying   Position: Supine  Examination: Incremental scanning of the suprapubic area using 3 MHz transducer using copious amounts of acoustic gel.   Findings: An anechoic area was demonstrated which represented the bladder, with measurement of residual urine as noted. I inspected this myself. In that the residual urine was stable or insignificant, no treatment will be necessary at this time.           Assessment and Plan  Diagnoses and all orders for this visit:    Benign non-nodular prostatic hyperplasia with lower urinary tract symptoms  -     POC Urinalysis Dipstick, Automated    Incomplete bladder emptying    Acquired bladder diverticulum  Mr. Hogue has a known history of benign prostatic hyperplasia with obstruction.  He is on maximum medical therapy with tamsulosin and finasteride.  He is tolerating these medications well.  However, he continues to have poor bladder emptying which is likely secondary to a hypocontractile bladder.  He has chronic changes in the bladder with a known bladder diverticulum that is quite large.  However, he remains infection free and has noted no episodes of hematuria.  I think it is safe to continue to follow these chronic urologic issues.  I would not further evaluate him unless he had worsening residuals, recurrent UTI, bladder stones, hematuria, or other lower urinary tract symptoms.    Loi Pace MD  08/24/17  8:52 AM      Cc: Viktor Frederick MD

## 2017-09-11 ENCOUNTER — TELEPHONE (OUTPATIENT)
Dept: UROLOGY | Facility: CLINIC | Age: 82
End: 2017-09-11

## 2017-09-11 NOTE — TELEPHONE ENCOUNTER
Pt daughter called about his script for Finasteride. His previous urologist had him take 5mg twice a day but Dr Pace wrote for once a day. She is wanting to clarify if it should be twice a day. Please call her at [phone], can leave a message.

## 2017-09-12 DIAGNOSIS — N40.1 BENIGN NON-NODULAR PROSTATIC HYPERPLASIA WITH LOWER URINARY TRACT SYMPTOMS: ICD-10-CM

## 2017-09-12 RX ORDER — FINASTERIDE 5 MG/1
5 TABLET, FILM COATED ORAL 2 TIMES DAILY
Qty: 120 TABLET | Refills: 3 | Status: SHIPPED | OUTPATIENT
Start: 2017-09-12 | End: 2017-09-20 | Stop reason: SDUPTHER

## 2017-09-20 DIAGNOSIS — N40.1 BENIGN NON-NODULAR PROSTATIC HYPERPLASIA WITH LOWER URINARY TRACT SYMPTOMS: ICD-10-CM

## 2017-09-20 RX ORDER — FINASTERIDE 5 MG/1
5 TABLET, FILM COATED ORAL DAILY
Qty: 90 TABLET | Refills: 3 | Status: SHIPPED | OUTPATIENT
Start: 2017-09-20 | End: 2018-01-01

## 2017-11-27 RX ORDER — RANOLAZINE 1000 MG/1
TABLET, FILM COATED, EXTENDED RELEASE ORAL
Qty: 180 TABLET | Refills: 1 | Status: SHIPPED | OUTPATIENT
Start: 2017-11-27

## 2018-01-01 ENCOUNTER — OFFICE VISIT (OUTPATIENT)
Dept: UROLOGY | Facility: CLINIC | Age: 83
End: 2018-01-01

## 2018-01-01 ENCOUNTER — TELEPHONE (OUTPATIENT)
Dept: CARDIOLOGY | Facility: CLINIC | Age: 83
End: 2018-01-01

## 2018-01-01 ENCOUNTER — TELEPHONE (OUTPATIENT)
Dept: UROLOGY | Facility: CLINIC | Age: 83
End: 2018-01-01

## 2018-01-01 ENCOUNTER — OFFICE VISIT (OUTPATIENT)
Dept: CARDIOLOGY | Facility: CLINIC | Age: 83
End: 2018-01-01

## 2018-01-01 VITALS
HEIGHT: 73 IN | SYSTOLIC BLOOD PRESSURE: 168 MMHG | BODY MASS INDEX: 20.15 KG/M2 | DIASTOLIC BLOOD PRESSURE: 76 MMHG | TEMPERATURE: 97.7 F | WEIGHT: 152 LBS

## 2018-01-01 VITALS
HEART RATE: 67 BPM | DIASTOLIC BLOOD PRESSURE: 50 MMHG | WEIGHT: 162 LBS | SYSTOLIC BLOOD PRESSURE: 120 MMHG | BODY MASS INDEX: 21.47 KG/M2 | OXYGEN SATURATION: 98 % | HEIGHT: 73 IN

## 2018-01-01 VITALS — BODY MASS INDEX: 21.2 KG/M2 | TEMPERATURE: 97.7 F | HEIGHT: 73 IN | WEIGHT: 160 LBS

## 2018-01-01 VITALS
SYSTOLIC BLOOD PRESSURE: 100 MMHG | OXYGEN SATURATION: 99 % | BODY MASS INDEX: 20.41 KG/M2 | DIASTOLIC BLOOD PRESSURE: 40 MMHG | HEIGHT: 73 IN | HEART RATE: 78 BPM | WEIGHT: 154 LBS

## 2018-01-01 DIAGNOSIS — Z95.1 S/P CABG X 4: ICD-10-CM

## 2018-01-01 DIAGNOSIS — I25.10 CAD IN NATIVE ARTERY: Primary | ICD-10-CM

## 2018-01-01 DIAGNOSIS — E78.2 MIXED HYPERLIPIDEMIA: ICD-10-CM

## 2018-01-01 DIAGNOSIS — N32.3 ACQUIRED BLADDER DIVERTICULUM: ICD-10-CM

## 2018-01-01 DIAGNOSIS — I10 ESSENTIAL HYPERTENSION: ICD-10-CM

## 2018-01-01 DIAGNOSIS — Z95.5 STENTED CORONARY ARTERY: ICD-10-CM

## 2018-01-01 DIAGNOSIS — N40.1 BENIGN NON-NODULAR PROSTATIC HYPERPLASIA WITH LOWER URINARY TRACT SYMPTOMS: Primary | ICD-10-CM

## 2018-01-01 DIAGNOSIS — N31.2 HYPOCONTRACTILE BLADDER: ICD-10-CM

## 2018-01-01 DIAGNOSIS — R94.39 ABNORMAL NUCLEAR STRESS TEST: ICD-10-CM

## 2018-01-01 LAB
BILIRUB BLD-MCNC: NEGATIVE MG/DL
BILIRUB BLD-MCNC: NEGATIVE MG/DL
CLARITY, POC: CLEAR
CLARITY, POC: CLEAR
COLOR UR: YELLOW
COLOR UR: YELLOW
GLUCOSE UR STRIP-MCNC: NEGATIVE MG/DL
GLUCOSE UR STRIP-MCNC: NEGATIVE MG/DL
KETONES UR QL: NEGATIVE
KETONES UR QL: NEGATIVE
LEUKOCYTE EST, POC: ABNORMAL
LEUKOCYTE EST, POC: ABNORMAL
NITRITE UR-MCNC: NEGATIVE MG/ML
NITRITE UR-MCNC: NEGATIVE MG/ML
PH UR: 5.5 [PH] (ref 5–8)
PH UR: 5.5 [PH] (ref 5–8)
PROT UR STRIP-MCNC: ABNORMAL MG/DL
PROT UR STRIP-MCNC: NEGATIVE MG/DL
RBC # UR STRIP: ABNORMAL /UL
RBC # UR STRIP: ABNORMAL /UL
SP GR UR: 1.01 (ref 1–1.03)
SP GR UR: 1.02 (ref 1–1.03)
UROBILINOGEN UR QL: NORMAL
UROBILINOGEN UR QL: NORMAL

## 2018-01-01 PROCEDURE — 51798 US URINE CAPACITY MEASURE: CPT | Performed by: UROLOGY

## 2018-01-01 PROCEDURE — 93000 ELECTROCARDIOGRAM COMPLETE: CPT | Performed by: INTERNAL MEDICINE

## 2018-01-01 PROCEDURE — 81001 URINALYSIS AUTO W/SCOPE: CPT | Performed by: UROLOGY

## 2018-01-01 PROCEDURE — 99213 OFFICE O/P EST LOW 20 MIN: CPT | Performed by: UROLOGY

## 2018-01-01 PROCEDURE — 99214 OFFICE O/P EST MOD 30 MIN: CPT | Performed by: INTERNAL MEDICINE

## 2018-01-01 PROCEDURE — 81003 URINALYSIS AUTO W/O SCOPE: CPT | Performed by: UROLOGY

## 2018-01-01 RX ORDER — CHLORAL HYDRATE 500 MG
CAPSULE ORAL
COMMUNITY

## 2018-01-01 RX ORDER — AMLODIPINE BESYLATE 2.5 MG/1
TABLET ORAL
Qty: 90 TABLET | Refills: 3 | Status: SHIPPED | OUTPATIENT
Start: 2018-01-01

## 2018-02-02 NOTE — PROGRESS NOTES
Pat Hogue  [id number]  1935  82 y.o.  male    Referring Provider: Viktor Frederick MD    Reason for Follow-up Visit:  Routine follow up.  coronary artery disease Coronary artery bypass grafting Stented coronary artery.   Overall feeling well   No chest pain or shortness of breath   No palpitations  Compliant with medications  Good effort tolerance  Rides bicycle 30 minutes at a time  History of present illness:  Pat Hogue is a 82 y.o. yo male with history of coronary artery disease Coronary artery bypass grafting Stented coronary artery.  who presents today for   Chief Complaint   Patient presents with   • Coronary Artery Disease     6 MON FU    .    History  Past Medical History:   Diagnosis Date   • Abnormal nuclear stress test    • Benign essential hypertension    • CAD in native artery    • Chest pain    • Chronic airway obstruction    • GI bleed    • Hematuria    • History of coronary artery bypass graft    • Hyperlipemia, mixed    • Hypertension    • Stented coronary artery    • Venous thrombosis    ,   Past Surgical History:   Procedure Laterality Date   • CARDIAC CATHETERIZATION Left 2006    : small vd, med mgmt;;2/2011 med mgm   • CHOLECYSTECTOMY     • CORONARY ARTERY BYPASS GRAFT  03/07/2006     LIMA to LAD, SVG to D1, SVG to OM2, SVG to PDA   • CORONARY STENT PLACEMENT      x 1 - 4/27/14   • GASTRECTOMY PARTIAL / TOTAL     ,   Family History   Problem Relation Age of Onset   • Heart attack Father    • No Known Problems Mother    ,   Social History   Substance Use Topics   • Smoking status: Former Smoker     Years: 30.00     Quit date: 1985   • Smokeless tobacco: Never Used   • Alcohol use No   ,     Medications  Current Outpatient Prescriptions   Medication Sig Dispense Refill   • amLODIPine (NORVASC) 2.5 MG tablet TAKE 1 TABLET EVERY DAY 90 tablet 3   • aspirin 81 MG tablet Take 81 mg by mouth Daily.     • atorvastatin (LIPITOR) 20 MG tablet Every Other Day.     • clopidogrel (PLAVIX) 75 MG  "tablet      • finasteride (PROSCAR) 5 MG tablet Take 1 tablet by mouth Daily for 360 days. 90 tablet 3   • losartan-hydrochlorothiazide (HYZAAR) 50-12.5 MG per tablet      • Multiple Vitamins-Minerals (MULTIVITAMIN ADULT PO) Take  by mouth.     • pantoprazole (PROTONIX) 40 MG EC tablet      • RANEXA 1000 MG 12 hr tablet TAKE 1 TABLET TWICE A  tablet 1   • sucralfate (CARAFATE) 1 G tablet      • tamsulosin (FLOMAX) 0.4 MG capsule 24 hr capsule Take 1 capsule by mouth Daily. 90 capsule 3     No current facility-administered medications for this visit.        Allergies:  Morphine and related    Review of Systems  Review of Systems   Constitution: Negative.   HENT: Negative.    Eyes: Negative.    Cardiovascular: Positive for dyspnea on exertion. Negative for chest pain, claudication, cyanosis, irregular heartbeat, leg swelling, near-syncope, orthopnea, palpitations, paroxysmal nocturnal dyspnea and syncope.   Respiratory: Negative.    Endocrine: Negative.    Hematologic/Lymphatic: Negative.    Skin: Negative.    Gastrointestinal: Negative for anorexia.   Genitourinary: Negative.    Neurological: Negative.    Psychiatric/Behavioral: Negative.        Objective     Physical Exam:  /50  Pulse 67  Ht 185.4 cm (72.99\")  Wt 73.5 kg (162 lb)  SpO2 98%  BMI 21.38 kg/m2  Physical Exam   Constitutional: He appears well-developed.   HENT:   Head: Normocephalic.   Neck: Normal carotid pulses and no JVD present. No tracheal tenderness present. Carotid bruit is not present. No tracheal deviation and no edema present.   Cardiovascular: Regular rhythm and normal pulses.    Murmur heard.   Systolic murmur is present with a grade of 2/6   Pulmonary/Chest: Effort normal. No stridor.   Abdominal: Soft.   Neurological: He is alert. He has normal strength. No cranial nerve deficit or sensory deficit.   Skin: Skin is warm.   Psychiatric: He has a normal mood and affect. His speech is normal and behavior is normal. " "      Results Review:  myocardial perfusion scan    1. Ejection fraction of 69%.   2. Moderate size area of ischemia in the lateral wall.   3. Attenuation artifacts noted.       ECG 12 Lead  Date/Time: 2/2/2018 9:31 AM  Performed by: ALESSIA ALARCON  Authorized by: ALESSIA ALARCON   Comparison: compared with previous ECG   Comparison to previous ECG: Lateral T wave inversion not as prominent  Rhythm: sinus rhythm  Rate: normal  Conduction: conduction normal  ST Segments: ST segments normal  T Waves: T waves normal  QRS axis: normal  Clinical impression: abnormal ECG and non-specific ECG            Assessment/Plan   Patient Active Problem List   Diagnosis   • CAD in native artery   • Essential hypertension   • Mixed hyperlipidemia   • Abnormal nuclear stress test MODERATE LATERAL ISCHEMIA   • S/P CABG x 4   • Stented coronary artery        No palpitations. No significant pedal edema. Compliant with medications and diet. Latest labs and medications reviewed.    Plan:    Keep LDL below 70 mg/dl. Monitor liver and renal functions.   Monitor CBC, CMP and Lipid Panel by primary   Low salt/ HTN/ Heart healthy carbohydrate restricted cardiac diet as applicable to this patient's current diagnoses.   This handout has relevant information regarding shopping for food, preparing meals, what to eat at restaurants, tracking of food intake, information regarding sodium intake and salt content, how to read food labels, knowing what to eat, tips reagarding physical activity, calorie count and calorie expenditure. What foods to avoid. Information regarding alcoholic drinks along with \"good\" and \"bad\" fats.  Relevant printed educational materials given pertinent to above diagnoses    No additional cardiac testing required at this point in time.   Monitor for any signs of bleeding including red or dark stools. Fall precautions.   Patient is asked to monitor BP at home or work, several times per month and return with written values at next " office visit.   Close follow up with you as scheduled.  Intensive factor modifications.  See order list.    Counseled regarding disease appropriate diet, fluid, caffeine, stimulants and sodium intake as well as importance of compliance to diet, exercise and regular follow up.  Avoid NSAIDS  He does not want any invasive testing  Gave a copy of my notes and relevant tests/ prior ECG etc for the patient to review and follow specific advise and relevant findings if any, prognosis, along with my current and future plans.     Return in about 6 months (around 8/2/2018).

## 2018-02-23 NOTE — PROGRESS NOTES
Mr. Hogue is 82 y.o. male    CHIEF COMPLAINT: I am here to follow up on my BPH.     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  His main symptom is incomplete bladder emptying which has occasionally led to urinary tract infection.  He currently manages his lower urinary tract with spontaneous voiding.  Since last visit he has done reasonably well.    The following portions of the patient's history were reviewed and updated as appropriate: allergies, current medications, past family history, past medical history, past social history, past surgical history and problem list.    Review of Systems   Constitutional: Negative for chills and fever.   Gastrointestinal: Negative for abdominal pain, anal bleeding and blood in stool.   Genitourinary: Negative for flank pain and hematuria.         Current Outpatient Prescriptions:   •  amLODIPine (NORVASC) 2.5 MG tablet, TAKE 1 TABLET EVERY DAY, Disp: 90 tablet, Rfl: 3  •  aspirin 81 MG tablet, Take 81 mg by mouth Daily., Disp: , Rfl:   •  atorvastatin (LIPITOR) 20 MG tablet, Every Other Day., Disp: , Rfl:   •  clopidogrel (PLAVIX) 75 MG tablet, , Disp: , Rfl:   •  finasteride (PROSCAR) 5 MG tablet, Take 1 tablet by mouth Daily for 360 days., Disp: 90 tablet, Rfl: 3  •  losartan-hydrochlorothiazide (HYZAAR) 50-12.5 MG per tablet, , Disp: , Rfl:   •  Multiple Vitamins-Minerals (MULTIVITAMIN ADULT PO), Take  by mouth., Disp: , Rfl:   •  Omega-3 Fatty Acids (FISH OIL) 1000 MG capsule capsule, Take  by mouth Daily With Breakfast., Disp: , Rfl:   •  pantoprazole (PROTONIX) 40 MG EC tablet, , Disp: , Rfl:   •  RANEXA 1000 MG 12 hr tablet, TAKE 1 TABLET TWICE A DAY, Disp: 180 tablet, Rfl: 1  •  sucralfate (CARAFATE) 1 G tablet, , Disp: , Rfl:   •  tamsulosin  "(FLOMAX) 0.4 MG capsule 24 hr capsule, Take 1 capsule by mouth Daily., Disp: 90 capsule, Rfl: 3    Past Medical History:   Diagnosis Date   • Abnormal nuclear stress test    • Benign essential hypertension    • CAD in native artery    • Chest pain    • Chronic airway obstruction    • GI bleed    • Hematuria    • History of coronary artery bypass graft    • Hyperlipemia, mixed    • Hypertension    • Stented coronary artery    • Venous thrombosis        Past Surgical History:   Procedure Laterality Date   • CARDIAC CATHETERIZATION Left 2006    : small vd, med mgmt;;2/2011 med mgm   • CHOLECYSTECTOMY     • CORONARY ARTERY BYPASS GRAFT  03/07/2006     LIMA to LAD, SVG to D1, SVG to OM2, SVG to PDA   • CORONARY STENT PLACEMENT      x 1 - 4/27/14   • GASTRECTOMY PARTIAL / TOTAL         Social History     Social History   • Marital status:      Spouse name: N/A   • Number of children: N/A   • Years of education: N/A     Social History Main Topics   • Smoking status: Former Smoker     Years: 30.00     Quit date: 1985   • Smokeless tobacco: Never Used   • Alcohol use No   • Drug use: No   • Sexual activity: Defer     Other Topics Concern   • None     Social History Narrative       Family History   Problem Relation Age of Onset   • Heart attack Father    • No Known Problems Mother        Temp 97.7 °F (36.5 °C)  Ht 185.4 cm (73\")  Wt 72.6 kg (160 lb)  BMI 21.11 kg/m2    Physical Exam  Constitutional: The patient  is oriented to person, place, and time. They  appear well-developed and well-nourished. No distress.   Pulmonary/Chest: Effort normal.   Abdominal: Soft. The patient exhibits no distension and no mass. There is no tenderness. There is no rebound and no guarding. No hernia.   Neurological: Patient is alert and oriented to person, place, and time.   Skin: Skin is warm and dry. Not diaphoretic.   Psychiatric:  normal mood and affect.   Vitals reviewed.      Results for orders placed or performed in visit on " 02/23/18   POC Urinalysis Dipstick, Automated   Result Value Ref Range    Color Yellow Yellow, Straw, Dark Yellow, Ashwini    Clarity, UA Clear Clear    Glucose, UA Negative Negative, 1000 mg/dL (3+) mg/dL    Bilirubin Negative Negative    Ketones, UA Negative Negative    Specific Gravity  1.015 1.005 - 1.030    Blood, UA Trace (A) Negative    pH, Urine 5.5 5.0 - 8.0    Protein, POC Negative Negative mg/dL    Urobilinogen, UA Normal Normal    Leukocytes Small (1+) (A) Negative    Nitrite, UA Negative Negative     Microscopic Urinalysis  I inspected the urine myself based on the clinical situation including the dipstick urine. The urine is spun in a centrifuge for three minutes. The spun urine shows 3-6 rbc/hpf, 0-2 wbc/hpf, 0-2 epi/hpf, trace bacteria, trace crystals, and negative casts.     Bladder Scan interpretation  Estimation of residual urine via abdominal ultrasound  Residual Urine: 554 ml  Indication: BPH  Position: Supine  Examination: Incremental scanning of the suprapubic area using 3 MHz transducer using copious amounts of acoustic gel.   Findings: An anechoic area was demonstrated which represented the bladder, with measurement of residual urine as noted. I inspected this myself. In that the residual urine was stable or insignificant, no treatment will be necessary at this time.       Assessment and Plan  Diagnoses and all orders for this visit:    Benign non-nodular prostatic hyperplasia with lower urinary tract symptoms  -     POC Urinalysis Dipstick, Automated    Acquired bladder diverticulum    Hypocontractile bladder      He continues to do well on spontaneous voiding without evidence of infection or duration and renal function.  Bladder scan, albeit abnormal, is stable.  We discussed the pros and cons of intermittent catheterization given that he is asymptomatic without any of these issues already mentioned, I think is reasonable for him to continue spontaneous voiding.  He does know that there is a  significant chance that he will ultimately need some type of catheterization either intermittent or permanent indwelling if he survived long enough.  This is likely progressive and is certainly not reversible.  I do not think any other measures would slower prevent progression.      Loi Pace MD  02/23/18  9:41 AM      Cc: Viktor Frederick MD

## 2018-06-08 NOTE — TELEPHONE ENCOUNTER
PT IS SCHEDULED FOR A LEFT CARPEL TUNNEL RELEASE ON 6/15/18 WITH DR JEFFERSON.    PT LOV 2/2018 - CAD - ON PLAVIX & ASA - LAST MYA 2014 & CABG 2006    DR JEFFERSON WOULD LIKE TO STOP PLAVIX TODAY ( X 7 DAYS)    PLEASE ADVISE

## 2018-08-01 NOTE — PROGRESS NOTES
Pat Hogue  [id number]  1935  83 y.o.  male    Referring Provider: Viktor Frederick MD    Reason for Follow-up Visit:  Routine follow up.  coronary artery disease Coronary artery bypass grafting Stented coronary artery.     Subjective   Overall feeling well   No chest pain  Mild chronic exertional shortness of breath on exertion relieved with rest  No significant cough or wheezing  Going on for several months  No palpitations  No associated chest pain  No significant pedal edema  No fever or chills  No significant expectoration  No hemoptysis  No presyncope or syncope   No palpitations  Compliant with medications  Good effort tolerance  Still rides  bicycle 30 minutes at a time  History of present illness:  Pat Hogue is a 83 y.o. yo male with history of coronary artery disease Coronary artery bypass grafting Stented coronary artery.  who presents today for   Chief Complaint   Patient presents with   • Coronary Artery Disease     6 MON FU    .    History  Past Medical History:   Diagnosis Date   • Abnormal nuclear stress test    • Benign essential hypertension    • CAD in native artery    • Cancer (CMS/HCC)     RIGHT ARM    • Chest pain    • Chronic airway obstruction (CMS/HCC)    • GI bleed    • Hematuria    • History of coronary artery bypass graft    • Hyperlipemia, mixed    • Hypertension    • Stented coronary artery    • Venous thrombosis    ,   Past Surgical History:   Procedure Laterality Date   • CARDIAC CATHETERIZATION Left 2006    : small vd, med mgmt;;2/2011 med mgm   • CARPAL TUNNEL RELEASE      LEFT    • CHOLECYSTECTOMY     • CORONARY ARTERY BYPASS GRAFT  03/07/2006     LIMA to LAD, SVG to D1, SVG to OM2, SVG to PDA   • CORONARY STENT PLACEMENT      x 1 - 4/27/14   • GASTRECTOMY PARTIAL / TOTAL     ,   Family History   Problem Relation Age of Onset   • Heart attack Father    • No Known Problems Mother    ,   Social History   Substance Use Topics   • Smoking status: Former Smoker     Years:  "30.00     Quit date: 1985   • Smokeless tobacco: Never Used   • Alcohol use No   ,     Medications  Current Outpatient Prescriptions   Medication Sig Dispense Refill   • amLODIPine (NORVASC) 2.5 MG tablet TAKE 1 TABLET EVERY DAY 90 tablet 3   • aspirin 81 MG tablet Take 81 mg by mouth Daily.     • atorvastatin (LIPITOR) 20 MG tablet Every Other Day.     • clopidogrel (PLAVIX) 75 MG tablet      • finasteride (PROSCAR) 5 MG tablet Take 1 tablet by mouth Daily for 360 days. 90 tablet 3   • losartan-hydrochlorothiazide (HYZAAR) 50-12.5 MG per tablet      • Multiple Vitamins-Minerals (MULTIVITAMIN ADULT PO) Take  by mouth.     • Omega-3 Fatty Acids (FISH OIL) 1000 MG capsule capsule Take  by mouth Daily With Breakfast.     • pantoprazole (PROTONIX) 40 MG EC tablet      • RANEXA 1000 MG 12 hr tablet TAKE 1 TABLET TWICE A  tablet 1   • sucralfate (CARAFATE) 1 G tablet      • tamsulosin (FLOMAX) 0.4 MG capsule 24 hr capsule Take 1 capsule by mouth Daily. 90 capsule 3     No current facility-administered medications for this visit.        Allergies:  Morphine and related    Review of Systems  Review of Systems   Constitution: Positive for weakness and malaise/fatigue. Negative for night sweats.   HENT: Negative.    Eyes: Negative.    Cardiovascular: Positive for dyspnea on exertion. Negative for chest pain, claudication, cyanosis, irregular heartbeat, leg swelling, near-syncope, orthopnea, palpitations, paroxysmal nocturnal dyspnea and syncope.   Respiratory: Negative.    Endocrine: Negative.    Hematologic/Lymphatic: Negative.    Skin: Negative.    Gastrointestinal: Negative for anorexia.   Genitourinary: Negative.    Psychiatric/Behavioral: Negative.        Objective     Physical Exam:  /40   Pulse 78   Ht 185.4 cm (72.99\")   Wt 69.9 kg (154 lb)   SpO2 99%   BMI 20.32 kg/m²   Physical Exam   Constitutional: He appears well-developed.   HENT:   Head: Normocephalic.   Neck: Normal carotid pulses and no JVD " present. No tracheal tenderness present. Carotid bruit is not present. No tracheal deviation and no edema present.   Cardiovascular: Regular rhythm and normal pulses.    Murmur heard.   Systolic murmur is present with a grade of 2/6   Pulmonary/Chest: Effort normal. No stridor.   Abdominal: Soft.   Neurological: He is alert. He has normal strength. No cranial nerve deficit or sensory deficit.   Skin: Skin is warm.   Psychiatric: He has a normal mood and affect. His speech is normal and behavior is normal.       Results Review:    Results for orders placed in visit on 06/17/16   SCANNED - ECHOCARDIOGRAM     myocardial perfusion scan    1. Ejection fraction of 69%.   2. Moderate size area of ischemia in the lateral wall.   3. Attenuation artifacts noted.       ECG 12 Lead  Date/Time: 8/1/2018 8:33 AM  Performed by: ALESSIA ALARCON  Authorized by: ALESSIA ALARCON   Comparison: compared with previous ECG from 2/2/2018  Comparison to previous ECG: left anterior fascicular block  IS NEW  Rhythm: sinus rhythm  Rate: normal  Conduction: LAFB  T depression: I and aVL  QRS axis: left  Clinical impression: abnormal ECG            Assessment/Plan   Patient Active Problem List   Diagnosis   • CAD in native artery   • Essential hypertension   • Mixed hyperlipidemia   • Abnormal nuclear stress test MODERATE LATERAL ISCHEMIA   • S/P CABG x 4   • Stented coronary artery        No palpitations. No significant pedal edema. Compliant with medications and diet. Latest labs and medications reviewed.    Plan:    Low salt/ HTN/ Heart healthy carbohydrate restricted cardiac diet as applicable to this patient's current diagnoses.   This handout has relevant information regarding shopping for food, preparing meals, what to eat at restaurants, tracking of food intake, information regarding sodium intake and salt content, how to read food labels, knowing what to eat, tips reagarding physical activity, calorie count and calorie expenditure. What foods  "to avoid. Information regarding alcoholic drinks along with \"good\" and \"bad\" fats.  Reiterated prior recommendations     The patient is advised to reduce or avoid caffeine or other cardiac stimulants.     The patient was advised that NSAID-type medications have three  very important potential side effects: cardiovascular complications, gastrointestinal irritation including hemorrhage and renal injuries.  Do not use anti-inflammatories such as Motrin/ibuprofen, Alleve/naprosyn, Mobic and like medications Use Tylenol instead.The patient expresses understanding of these issues and questions were answered.   Monitor for any signs of bleeding including red or dark stools. Fall precautions.       Monitor for any signs of bleeding including red or dark stools. Fall precautions.   Patient is asked to monitor BP at home or work, several times per month and return with written values at next office visit.     Advised staying uptodate with immunizations per established standard guidelines.    Reviewed available prior notes, consults, prior visits, laboratory findings, radiology And cardiology relevant reports. Updated chart as applicable. I have reviewed the patient's medical history in detail and updated the computerized patient record as relevant.    Updated patient regarding any new or relevant abnormalities on review of records or any new findings on physical exam. Mentioned to patient about purpose of visit and desirable health short and long term goals and objectives.    Offered to give patient  a copy of my notes and relevant tests/ prior ECG etc for the patient to review and follow specific advise and relevant findings if any, prognosis, along with my current and future plans.      Questions were encouraged, asked and answered to the patient's  understanding and satisfaction. Questions if any regarding current medications and side effects, need for refills and importance of compliance to medications " stressed.    Reviewed available prior notes, consults, prior visits, laboratory findings, radiology and cardiology relevant reports. Updated chart as applicable. I have reviewed the patient's medical history in detail and updated the computerized patient record as relevant.    Updated patient regarding any new or relevant abnormalities on review of records or any new findings on physical exam. Mentioned to patient about purpose of visit and desirable health short and long term goals and objectives.    Monitor CBC, CMP, TSH (as indicated) and Lipid Panel by primary     No additional cardiac testing required at this point in time.         Return in about 6 months (around 2/1/2019).

## 2018-08-28 NOTE — PROGRESS NOTES
Mr. Hogue is 83 y.o. male    CHIEF COMPLAINT: follow up urinary tract infections.     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  His main symptom is incomplete bladder emptying which has occasionally led to urinary tract infection.  Since his last visit, he has been without UTI . He currently manages his lower urinary tract with spontaneous/timed voiding.      The following portions of the patient's history were reviewed and updated as appropriate: allergies, current medications, past family history, past medical history, past social history, past surgical history and problem list.      Review of Systems   Constitutional: Negative for chills and fever.   Gastrointestinal: Negative for abdominal pain, anal bleeding and blood in stool.   Genitourinary: Positive for difficulty urinating. Negative for flank pain, frequency, hematuria and urgency.           Current Outpatient Prescriptions:   •  amLODIPine (NORVASC) 2.5 MG tablet, TAKE 1 TABLET EVERY DAY, Disp: 90 tablet, Rfl: 3  •  aspirin 81 MG tablet, Take 81 mg by mouth Daily., Disp: , Rfl:   •  atorvastatin (LIPITOR) 20 MG tablet, Every Other Day., Disp: , Rfl:   •  clopidogrel (PLAVIX) 75 MG tablet, , Disp: , Rfl:   •  finasteride (PROSCAR) 5 MG tablet, Take 1 tablet by mouth Daily for 360 days., Disp: 90 tablet, Rfl: 3  •  losartan-hydrochlorothiazide (HYZAAR) 50-12.5 MG per tablet, , Disp: , Rfl:   •  Multiple Vitamins-Minerals (MULTIVITAMIN ADULT PO), Take  by mouth., Disp: , Rfl:   •  Omega-3 Fatty Acids (FISH OIL) 1000 MG capsule capsule, Take  by mouth Daily With Breakfast., Disp: , Rfl:   •  pantoprazole (PROTONIX) 40 MG EC tablet, , Disp: , Rfl:   •  RANEXA 1000 MG 12 hr tablet, TAKE 1 TABLET TWICE A DAY, Disp: 180 tablet, Rfl: 1  •  sucralfate (CARAFATE) 1 G  "tablet, , Disp: , Rfl:   •  tamsulosin (FLOMAX) 0.4 MG capsule 24 hr capsule, Take 1 capsule by mouth Daily., Disp: 90 capsule, Rfl: 3    Past Medical History:   Diagnosis Date   • Abnormal nuclear stress test    • Benign essential hypertension    • CAD in native artery    • Cancer (CMS/HCC)     RIGHT ARM    • Chest pain    • Chronic airway obstruction (CMS/HCC)    • GI bleed    • Hematuria    • History of coronary artery bypass graft    • Hyperlipemia, mixed    • Hypertension    • Stented coronary artery    • Venous thrombosis        Past Surgical History:   Procedure Laterality Date   • CARDIAC CATHETERIZATION Left 2006    : small vd, med mgmt;;2/2011 med mgm   • CARPAL TUNNEL RELEASE      LEFT    • CHOLECYSTECTOMY     • CORONARY ARTERY BYPASS GRAFT  03/07/2006     LIMA to LAD, SVG to D1, SVG to OM2, SVG to PDA   • CORONARY STENT PLACEMENT      x 1 - 4/27/14   • GASTRECTOMY PARTIAL / TOTAL         Social History     Social History   • Marital status:      Social History Main Topics   • Smoking status: Former Smoker     Years: 30.00     Quit date: 1985   • Smokeless tobacco: Never Used   • Alcohol use No   • Drug use: No   • Sexual activity: Defer     Other Topics Concern   • Not on file       Family History   Problem Relation Age of Onset   • Heart attack Father    • No Known Problems Mother          /76   Temp 97.7 °F (36.5 °C)   Ht 185.4 cm (73\")   Wt 68.9 kg (152 lb)   BMI 20.05 kg/m²       Physical Exam  Neuro: No confusion; No disorientation; Alert and oriented  Pulmonary: No respiratory distress.   Skin: No pallor or diaphoresis        Data  Results for orders placed or performed in visit on 08/30/18   POC Urinalysis Dipstick, Multipro   Result Value Ref Range    Color Yellow Yellow, Straw, Dark Yellow, Ashwini    Clarity, UA Clear Clear    Glucose, UA Negative Negative, 1000 mg/dL (3+) mg/dL    Bilirubin Negative Negative    Ketones, UA Negative Negative    Specific Gravity  1.025 1.005 - " 1.030    Blood, UA Large (A) Negative    pH, Urine 5.5 5.0 - 8.0    Protein, POC Trace (A) Negative mg/dL    Urobilinogen, UA Normal Normal    Nitrite, UA Negative Negative    Leukocytes Moderate (2+) (A) Negative         Imaging Results (last 7 days)     ** No results found for the last 168 hours. **        International Prostate Symptom Score  The following is posted based on patient questionnaire answers:  0 - not at all    1-7 mild symptoms  1- Less than one time in five  8-19 moderate symptoms  2 -Less than half the time  20-35 severe symptoms  3 - About half the time  4 - More than half the time  5 - Almost always     For following sections:  Incomplete Emptying: - How often have you had the sensation  of not emptying your bladder completely after you finished urinating?  3  Frequency: -How often have you had to urinate again less than   two hours after you finished urinating?      2  Intermittency: -How often have you found you stopped and started again  Several times when you urinate?       2  Urgency: -How often do you find it difficult to postpone urination?             3  Weak stream: - How often have you had a weak urinary stream?             2  Straining: - How often have you had to push or strain to begin  Urination?          1  Sleeping: -How many times did you most typically get up to urinate   From the time you went to bed at night until the time you got up in the   1  Morning          Total `  14    Quality of Life  How would you feel if you had to live with your urinary condition the way   2  It is now, no better, no worse for the rest of your life?    Where: 0=delighted; 1= pleased, 2= mostly satisfied, 3= mixed, 4 = mostly  Dissatisfied, 5= Unhappy, 6 = terrible    Bladder Scan interpretation  Estimation of residual urine via abdominal ultrasound  Residual Urine: 626 ml  Indication: BPH   Position: Supine  Examination: Incremental scanning of the suprapubic area using 3 MHz transducer using  copious amounts of acoustic gel.   Findings: An anechoic area was demonstrated which represented the bladder, with measurement of residual urine as noted. I inspected this myself. In that the residual urine was stable or insignificant, no treatment will be necessary at this time.       Assessment and Plan  Diagnoses and all orders for this visit:    Benign non-nodular prostatic hyperplasia with lower urinary tract symptoms  -     POC Urinalysis Dipstick, Multipro    Hypocontractile bladder    -Both of these chronic Urologic conditions were evaluated today and felt to be stable.  -He will need to contact me if his routine lab work shows deteriorating kidney function or UTI's become an issue, we may need to start intermittent catheterization.  (Please note that portions of this note were completed with a voice recognition program.)  Loi Pace MD  08/30/18  10:36 AM  Cc: Viktor Frederick MD

## 2018-11-12 NOTE — TELEPHONE ENCOUNTER
Patient's daughter wanted to let us know that the patient is at [facility] in [place], [place]. She just wanted to let [place] know.